# Patient Record
Sex: MALE | Race: WHITE | Employment: UNEMPLOYED | URBAN - NONMETROPOLITAN AREA
[De-identification: names, ages, dates, MRNs, and addresses within clinical notes are randomized per-mention and may not be internally consistent; named-entity substitution may affect disease eponyms.]

---

## 2021-07-19 ENCOUNTER — HOSPITAL ENCOUNTER (EMERGENCY)
Age: 61
Discharge: HOME | End: 2021-07-19
Payer: COMMERCIAL

## 2021-07-19 ENCOUNTER — TELEPHONE (OUTPATIENT)
Dept: UROLOGY | Age: 61
End: 2021-07-19

## 2021-07-19 ENCOUNTER — APPOINTMENT (OUTPATIENT)
Dept: CT IMAGING | Age: 61
End: 2021-07-19
Payer: COMMERCIAL

## 2021-07-19 ENCOUNTER — OFFICE VISIT (OUTPATIENT)
Dept: UROLOGY | Age: 61
End: 2021-07-19
Payer: COMMERCIAL

## 2021-07-19 VITALS
BODY MASS INDEX: 44.95 KG/M2 | DIASTOLIC BLOOD PRESSURE: 88 MMHG | HEIGHT: 70 IN | SYSTOLIC BLOOD PRESSURE: 136 MMHG | WEIGHT: 314 LBS | TEMPERATURE: 97.8 F | HEART RATE: 97 BPM

## 2021-07-19 VITALS
WEIGHT: 310 LBS | BODY MASS INDEX: 44.48 KG/M2 | RESPIRATION RATE: 18 BRPM | DIASTOLIC BLOOD PRESSURE: 75 MMHG | OXYGEN SATURATION: 93 % | TEMPERATURE: 99.2 F | HEART RATE: 75 BPM | SYSTOLIC BLOOD PRESSURE: 145 MMHG

## 2021-07-19 DIAGNOSIS — N20.0 KIDNEY STONE: Primary | ICD-10-CM

## 2021-07-19 DIAGNOSIS — N20.1 URETERAL STONE: Primary | ICD-10-CM

## 2021-07-19 LAB
-: ABNORMAL
ABSOLUTE EOS #: 0.11 K/UL (ref 0–0.44)
ABSOLUTE IMMATURE GRANULOCYTE: 0.03 K/UL (ref 0–0.3)
ABSOLUTE LYMPH #: 0.95 K/UL (ref 1.1–3.7)
ABSOLUTE MONO #: 0.77 K/UL (ref 0.1–1.2)
ALBUMIN SERPL-MCNC: 4.1 G/DL (ref 3.5–5.2)
ALBUMIN/GLOBULIN RATIO: 1.2 (ref 1–2.5)
ALP BLD-CCNC: 59 U/L (ref 40–129)
ALT SERPL-CCNC: 17 U/L (ref 5–41)
AMORPHOUS: ABNORMAL
ANION GAP SERPL CALCULATED.3IONS-SCNC: 15 MMOL/L (ref 9–17)
AST SERPL-CCNC: 21 U/L
BACTERIA: ABNORMAL
BASOPHILS # BLD: 1 % (ref 0–2)
BASOPHILS ABSOLUTE: 0.05 K/UL (ref 0–0.2)
BILIRUB SERPL-MCNC: 0.56 MG/DL (ref 0.3–1.2)
BILIRUBIN URINE: ABNORMAL
BUN BLDV-MCNC: 19 MG/DL (ref 6–20)
BUN/CREAT BLD: 16 (ref 9–20)
CALCIUM SERPL-MCNC: 8.8 MG/DL (ref 8.6–10.4)
CASTS UA: ABNORMAL /LPF
CHLORIDE BLD-SCNC: 104 MMOL/L (ref 98–107)
CO2: 23 MMOL/L (ref 20–31)
COLOR: ABNORMAL
COMMENT UA: ABNORMAL
CREAT SERPL-MCNC: 1.17 MG/DL (ref 0.7–1.2)
CRYSTALS, UA: ABNORMAL /HPF
DIFFERENTIAL TYPE: ABNORMAL
EOSINOPHILS RELATIVE PERCENT: 1 % (ref 1–4)
EPITHELIAL CELLS UA: ABNORMAL /HPF (ref 0–5)
GFR AFRICAN AMERICAN: >60 ML/MIN
GFR NON-AFRICAN AMERICAN: >60 ML/MIN
GFR SERPL CREATININE-BSD FRML MDRD: ABNORMAL ML/MIN/{1.73_M2}
GFR SERPL CREATININE-BSD FRML MDRD: ABNORMAL ML/MIN/{1.73_M2}
GLUCOSE BLD-MCNC: 146 MG/DL (ref 70–99)
GLUCOSE URINE: ABNORMAL
HCT VFR BLD CALC: 46.6 % (ref 40.7–50.3)
HEMOGLOBIN: 15.4 G/DL (ref 13–17)
IMMATURE GRANULOCYTES: 0 %
KETONES, URINE: ABNORMAL
LEUKOCYTE ESTERASE, URINE: ABNORMAL
LIPASE: 31 U/L (ref 13–60)
LYMPHOCYTES # BLD: 9 % (ref 24–43)
MCH RBC QN AUTO: 30.3 PG (ref 25.2–33.5)
MCHC RBC AUTO-ENTMCNC: 33 G/DL (ref 28.4–34.8)
MCV RBC AUTO: 91.7 FL (ref 82.6–102.9)
MONOCYTES # BLD: 7 % (ref 3–12)
MUCUS: ABNORMAL
NITRITE, URINE: ABNORMAL
NRBC AUTOMATED: 0 PER 100 WBC
OTHER OBSERVATIONS UA: ABNORMAL
PDW BLD-RTO: 12.9 % (ref 11.8–14.4)
PH UA: ABNORMAL (ref 5–9)
PLATELET # BLD: 231 K/UL (ref 138–453)
PLATELET ESTIMATE: ABNORMAL
PMV BLD AUTO: 9.6 FL (ref 8.1–13.5)
POTASSIUM SERPL-SCNC: 3.8 MMOL/L (ref 3.7–5.3)
PROTEIN UA: ABNORMAL
RBC # BLD: 5.08 M/UL (ref 4.21–5.77)
RBC # BLD: ABNORMAL 10*6/UL
RBC UA: ABNORMAL /HPF (ref 0–2)
RENAL EPITHELIAL, UA: ABNORMAL /HPF
SEG NEUTROPHILS: 82 % (ref 36–65)
SEGMENTED NEUTROPHILS ABSOLUTE COUNT: 8.79 K/UL (ref 1.5–8.1)
SODIUM BLD-SCNC: 142 MMOL/L (ref 135–144)
SPECIFIC GRAVITY UA: 1.02 (ref 1.01–1.02)
TOTAL PROTEIN: 7.4 G/DL (ref 6.4–8.3)
TRICHOMONAS: ABNORMAL
TURBIDITY: ABNORMAL
URINE HGB: ABNORMAL
UROBILINOGEN, URINE: ABNORMAL
WBC # BLD: 10.7 K/UL (ref 3.5–11.3)
WBC # BLD: ABNORMAL 10*3/UL
WBC UA: ABNORMAL /HPF (ref 0–5)
YEAST: ABNORMAL

## 2021-07-19 PROCEDURE — 83690 ASSAY OF LIPASE: CPT

## 2021-07-19 PROCEDURE — 80053 COMPREHEN METABOLIC PANEL: CPT

## 2021-07-19 PROCEDURE — 96375 TX/PRO/DX INJ NEW DRUG ADDON: CPT

## 2021-07-19 PROCEDURE — 36415 COLL VENOUS BLD VENIPUNCTURE: CPT

## 2021-07-19 PROCEDURE — 99213 OFFICE O/P EST LOW 20 MIN: CPT | Performed by: NURSE PRACTITIONER

## 2021-07-19 PROCEDURE — 81001 URINALYSIS AUTO W/SCOPE: CPT

## 2021-07-19 PROCEDURE — 96374 THER/PROPH/DIAG INJ IV PUSH: CPT

## 2021-07-19 PROCEDURE — 85025 COMPLETE CBC W/AUTO DIFF WBC: CPT

## 2021-07-19 PROCEDURE — 6360000002 HC RX W HCPCS: Performed by: PHYSICIAN ASSISTANT

## 2021-07-19 PROCEDURE — 2580000003 HC RX 258: Performed by: PHYSICIAN ASSISTANT

## 2021-07-19 PROCEDURE — 74176 CT ABD & PELVIS W/O CONTRAST: CPT

## 2021-07-19 PROCEDURE — 99284 EMERGENCY DEPT VISIT MOD MDM: CPT

## 2021-07-19 RX ORDER — CEPHALEXIN 500 MG/1
CAPSULE ORAL
COMMUNITY
Start: 2021-07-16 | End: 2021-07-21 | Stop reason: ALTCHOICE

## 2021-07-19 RX ORDER — KETOROLAC TROMETHAMINE 15 MG/ML
30 INJECTION, SOLUTION INTRAMUSCULAR; INTRAVENOUS ONCE
Status: COMPLETED | OUTPATIENT
Start: 2021-07-19 | End: 2021-07-19

## 2021-07-19 RX ORDER — ONDANSETRON 2 MG/ML
4 INJECTION INTRAMUSCULAR; INTRAVENOUS ONCE
Status: COMPLETED | OUTPATIENT
Start: 2021-07-19 | End: 2021-07-19

## 2021-07-19 RX ORDER — FENTANYL CITRATE 50 UG/ML
25 INJECTION, SOLUTION INTRAMUSCULAR; INTRAVENOUS ONCE
Status: COMPLETED | OUTPATIENT
Start: 2021-07-19 | End: 2021-07-19

## 2021-07-19 RX ORDER — ACETAMINOPHEN 500 MG
500 TABLET ORAL EVERY 6 HOURS PRN
COMMUNITY
End: 2021-09-22

## 2021-07-19 RX ORDER — TAMSULOSIN HYDROCHLORIDE 0.4 MG/1
CAPSULE ORAL
COMMUNITY
Start: 2021-07-16 | End: 2021-09-22

## 2021-07-19 RX ORDER — APIXABAN 5 MG/1
5 TABLET, FILM COATED ORAL 2 TIMES DAILY
COMMUNITY
Start: 2021-07-14 | End: 2021-09-22

## 2021-07-19 RX ORDER — 0.9 % SODIUM CHLORIDE 0.9 %
500 INTRAVENOUS SOLUTION INTRAVENOUS ONCE
Status: COMPLETED | OUTPATIENT
Start: 2021-07-19 | End: 2021-07-19

## 2021-07-19 RX ORDER — HYDROCODONE BITARTRATE AND ACETAMINOPHEN 5; 325 MG/1; MG/1
TABLET ORAL
COMMUNITY
Start: 2021-07-16 | End: 2021-09-22

## 2021-07-19 RX ORDER — PHENAZOPYRIDINE HYDROCHLORIDE 200 MG/1
TABLET, FILM COATED ORAL
COMMUNITY
Start: 2021-07-16 | End: 2021-09-22

## 2021-07-19 RX ADMIN — KETOROLAC TROMETHAMINE 30 MG: 15 INJECTION, SOLUTION INTRAMUSCULAR; INTRAVENOUS at 15:27

## 2021-07-19 RX ADMIN — ONDANSETRON 4 MG: 2 INJECTION INTRAMUSCULAR; INTRAVENOUS at 15:25

## 2021-07-19 RX ADMIN — FENTANYL CITRATE 25 MCG: 50 INJECTION INTRAMUSCULAR; INTRAVENOUS at 15:30

## 2021-07-19 RX ADMIN — SODIUM CHLORIDE 500 ML: 9 INJECTION, SOLUTION INTRAVENOUS at 15:24

## 2021-07-19 ASSESSMENT — PAIN SCALES - GENERAL
PAINLEVEL_OUTOF10: 6
PAINLEVEL_OUTOF10: 6
PAINLEVEL_OUTOF10: 9

## 2021-07-19 ASSESSMENT — ENCOUNTER SYMPTOMS
EYE REDNESS: 0
VOMITING: 0
BACK PAIN: 1
CONSTIPATION: 0
BACK PAIN: 0
NAUSEA: 0
BOWEL INCONTINENCE: 0
COLOR CHANGE: 0
ABDOMINAL PAIN: 0
SHORTNESS OF BREATH: 0
COUGH: 0
WHEEZING: 0

## 2021-07-19 ASSESSMENT — PAIN DESCRIPTION - PAIN TYPE
TYPE: ACUTE PAIN
TYPE: ACUTE PAIN

## 2021-07-19 ASSESSMENT — PAIN DESCRIPTION - FREQUENCY: FREQUENCY: INTERMITTENT

## 2021-07-19 ASSESSMENT — PAIN DESCRIPTION - LOCATION
LOCATION: FLANK
LOCATION: FLANK

## 2021-07-19 ASSESSMENT — PAIN DESCRIPTION - DESCRIPTORS: DESCRIPTORS: SHARP

## 2021-07-19 ASSESSMENT — PAIN DESCRIPTION - ORIENTATION: ORIENTATION: RIGHT

## 2021-07-19 NOTE — TELEPHONE ENCOUNTER
Wife called stated patient is c/o severe pain since his stent pull this AM and nausea. He is not able to sit, stand or lie down d/t the pain. He did take a norco, no relief.         Writer spoke to [facility] and VO given to patient to go to ER

## 2021-07-19 NOTE — ED PROVIDER NOTES
[facility] ED  eMERGENCY dEPARTMENT eNCOUnter      Pt Name: Marlen Pathak  MRN: [id number]  [place] 1960  Date of evaluation: 7/19/21      CHIEF COMPLAINT       Chief Complaint   Patient presents with    Flank Pain     right side-had uretal stent removed today around noon. Had stones blasted stones Sat morning         HISTORY OF PRESENT ILLNESS    Marlen Pathak is a 62 y.o. male who presents complaining of right side flank pain  The history is provided by the patient. Back Pain  Pain location: Right flank pain states that he had a lithotripsy done on Saturday and had a stent removed today by the urologist he is now having right flank pain with some nausea. He does have some blood in the urine. Quality:  Aching  Pain severity:  Moderate  Timing:  Constant  Progression:  Unchanged  Chronicity:  New  Relieved by:  Nothing  Worsened by:  Nothing  Ineffective treatments:  None tried  Associated symptoms: no bladder incontinence, no bowel incontinence, no dysuria, no leg pain, no paresthesias, no pelvic pain, no perianal numbness and no tingling        REVIEW OF SYSTEMS       Review of Systems   Gastrointestinal: Negative for bowel incontinence. Genitourinary: Positive for hematuria. Negative for bladder incontinence, dysuria and pelvic pain. Musculoskeletal: Positive for back pain. Neurological: Negative for tingling and paresthesias. All other systems reviewed and are negative.       PAST MEDICAL HISTORY     Past Medical History:   Diagnosis Date    Hyperglycemia     Kidney stone        SURGICAL HISTORY       Past Surgical History:   Procedure Laterality Date    CYSTOSCOPY Right 07/17/2021    Right HLL done at [address] [place] [place] [address]       Discharge Medication List as of 7/19/2021  6:13 PM      CONTINUE these medications which have NOT CHANGED    Details   ELIQUIS 5 MG TABS tablet Take 5 mg by mouth 2 times daily , DAWHistorical Med      cephALEXin (KEFLEX) 500 MG capsule take 1 capsule by mouth every 12 hours for 3 daysHistorical Med      HYDROcodone-acetaminophen (NORCO) 5-325 MG per tablet take 1 tablet by mouth every 6 hours if needed for pain for 3 daysHistorical Med      phenazopyridine (PYRIDIUM) 200 MG tablet take 1 tablet by mouth every 8 hours if needed BURNING TO VOIDHistorical Med      tamsulosin (FLOMAX) 0.4 MG capsule take 1 capsule by mouth at bedtimeHistorical Med      acetaminophen (TYLENOL) 500 MG tablet Take 500 mg by mouth every 6 hours as needed for PainHistorical Med             ALLERGIES     has No Known Allergies. FAMILY HISTORY     He indicated that his mother is alive. He indicated that his father is . SOCIAL HISTORY      reports that he quit smoking about 26 years ago. His smoking use included cigarettes. He started smoking about 41 years ago. He has a 22.50 pack-year smoking history. He quit smokeless tobacco use about 8 years ago. His smokeless tobacco use included snuff. He reports current alcohol use. He reports that he does not use drugs. PHYSICAL EXAM     INITIAL VITALS: BP (!) 145/75   Pulse 75   Temp 99.2 °F (37.3 °C)   Resp 18   Wt (!) 310 lb (140.6 kg)   SpO2 93%   BMI 44.48 kg/m²      Physical Exam  Vitals and nursing note reviewed. Constitutional:       Appearance: He is well-developed. HENT:      Head: Normocephalic and atraumatic. Eyes:      Pupils: Pupils are equal, round, and reactive to light. Cardiovascular:      Rate and Rhythm: Normal rate and regular rhythm. Heart sounds: Normal heart sounds. No murmur heard. Pulmonary:      Effort: Pulmonary effort is normal.      Breath sounds: Normal breath sounds. Abdominal:      General: Bowel sounds are normal.      Palpations: Abdomen is soft. Tenderness: There is no abdominal tenderness. Musculoskeletal:         General: Normal range of motion. Cervical back: Normal range of motion. Back:    Skin:     General: Skin is warm and dry. Capillary Refill: Capillary refill takes less than 2 seconds. Neurological:      Mental Status: He is alert and oriented to person, place, and time. MEDICAL DECISION MAKING:   He is pain-free upon rexam, He has ATB, pain med and flomax at home. He is anxious to go home, discussed test results with Patient. Continue current medications follow-up with your urologist as scheduled if pain returns please return to the emergency department or if you have any worsening of symptoms. DIAGNOSTIC RESULTS     EKG: All EKG's are interpreted by the Emergency Department Physician who either signs or Co-signs this chart in the absence of acardiologist.        RADIOLOGY:Allplain film, CT, MRI, and formal ultrasound images (except ED bedside ultrasound) are read by the radiologist and the images and interpretations are directly viewed by the emergency physician. LABS:All lab results were reviewed by myself, and all abnormals are listed below. Labs Reviewed   CBC WITH AUTO DIFFERENTIAL - Abnormal; Notable for the following components:       Result Value    Seg Neutrophils 82 (*)     Lymphocytes 9 (*)     Segs Absolute 8.79 (*)     Absolute Lymph # 0.95 (*)     All other components within normal limits   COMPREHENSIVE METABOLIC PANEL W/ REFLEX TO MG FOR LOW K - Abnormal; Notable for the following components:    Glucose 146 (*)     All other components within normal limits   URINE RT REFLEX TO CULTURE - Abnormal; Notable for the following components:    Color, UA RED (*)     Turbidity UA CLOUDY (*)     Glucose, Ur   (*)     Value: INTERPRET WITH CAUTION DUE TO INTENSE COLOR OF URINE. Bilirubin Urine   (*)     Value: INTERPRET WITH CAUTION DUE TO INTENSE COLOR OF URINE. Ketones, Urine   (*)     Value: INTERPRET WITH CAUTION DUE TO INTENSE COLOR OF URINE. Urine Hgb   (*)     Value: INTERPRET WITH CAUTION DUE TO INTENSE COLOR OF URINE.     Protein, UA   (*)     Value: INTERPRET WITH CAUTION DUE TO INTENSE COLOR OF URINE. Urobilinogen, Urine   (*)     Value: INTERPRET WITH CAUTION DUE TO INTENSE COLOR OF URINE. Nitrite, Urine   (*)     Value: INTERPRET WITH CAUTION DUE TO INTENSE COLOR OF URINE. Leukocyte Esterase, Urine   (*)     Value: INTERPRET WITH CAUTION DUE TO INTENSE COLOR OF URINE.     All other components within normal limits   MICROSCOPIC URINALYSIS - Abnormal; Notable for the following components:    Bacteria, UA TRACE (*)     All other components within normal limits   LIPASE         EMERGENCY DEPARTMENT COURSE:   Vitals:    Vitals:    07/19/21 1540 07/19/21 1632 07/19/21 1700 07/19/21 1800   BP: (!) 149/75 (!) 146/37 131/62 (!) 145/75   Pulse:  75     Resp:  18     Temp:       SpO2: 93% 94% 94% 93%   Weight:           The patient was given the following medications while in the emergency department:  Orders Placed This Encounter   Medications    ondansetron (ZOFRAN) injection 4 mg    ketorolac (TORADOL) injection 30 mg    fentaNYL (SUBLIMAZE) injection 25 mcg    0.9 % sodium chloride bolus       -------------------------      CRITICAL CARE:     CONSULTS:  None    PROCEDURES:  Procedures    FINAL IMPRESSION      1. Kidney stone Stable         DISPOSITION/PLAN   DISPOSITION        PATIENT REFERREDTO:  Khadar Pickering MD  [address] [place] [address]  [phone]    Schedule an appointment as soon as possible for a visit in 2 days      [facility] ED  [address] [place] [address]  [phone]    If symptoms worsen      DISCHARGEMEDICATIONS:  Discharge Medication List as of 7/19/2021  6:13 PM          (Please note that portions of this note were completed with a voice recognition program.  Efforts were made to edit thedictations but occasionally words are mis-transcribed.)    CADENCE Bergman PA-C  07/19/21 5113

## 2021-07-19 NOTE — PROGRESS NOTES
allergies. History reviewed. No pertinent family history. Social History     Tobacco Use   Smoking Status Former Smoker    Packs/day: 1.50    Years: 15.00    Pack years: 22.50    Types: Cigarettes    Start date: 37    Quit date:     Years since quittin.5   Smokeless Tobacco Former User    Types: Snuff    Quit date:        Social History     Substance and Sexual Activity   Alcohol Use Yes       Review of Systems   Constitutional: Negative for appetite change, chills and fever. Eyes: Negative for redness and visual disturbance. Respiratory: Negative for cough, shortness of breath and wheezing. Cardiovascular: Negative for chest pain and leg swelling. Gastrointestinal: Negative for abdominal pain, constipation, nausea and vomiting. Genitourinary: Negative for decreased urine volume, difficulty urinating, discharge, dysuria, enuresis, flank pain, frequency, hematuria, penile pain, scrotal swelling, testicular pain and urgency. Musculoskeletal: Negative for back pain, joint swelling and myalgias. Skin: Negative for color change, rash and wound. Neurological: Negative for dizziness, tremors and numbness. Hematological: Negative for adenopathy. Does not bruise/bleed easily. /88 (Site: Right Upper Arm, Position: Sitting, Cuff Size: Large Adult)   Pulse 97   Temp 97.8 °F (36.6 °C) (Temporal)   Ht 5' 10\" (1.778 m)   Wt (!) 314 lb (142.4 kg)   BMI 45.05 kg/m²       PHYSICAL EXAM:  Constitutional: Patient in no acute distress; Neuro: alert and oriented to person place and time. Psych: Mood and affect normal.  Skin: Normal  Lungs: Respiratory effort normal  Cardiovascular:  Normal peripheral pulses  Abdomen: Soft, non-tender, non-distended with no CVA, flank pain  Bladder non-tender and not distended. No results found for: BUN  No results found for: CREATININE  No results found for: PSA    ASSESSMENT:   Diagnosis Orders   1.  Ureteral stone  XR ABDOMEN (KUB) (SINGLE AP VIEW)         PLAN:  You may experience waves of pain and/or nausea for the next 24-72 hrs. You may also experience burning with urination, frequency, urgency, bladder spasms, and blood in the urine. All of this should continue to improve over the next several days. The blood in the urine can last up to two weeks. 1) take over the counter tylenol as directed every 4-6 hours for the next 48-72 hours  2) take Flomax for the next 72 hours. 3) drink at least 80 oz fluid (water, juice, Gatorade - NOT tea, coffee, soda pop) daily    Take norco as directed if needed     Finish antibiotic as prescribed     Call our office [phone] or go to ER (if after normal office hours) if you develop fever, intractable vomiting, severe/intolerable pain. Follow-up in 6 weeks with a KUB prior or sooner if needed.

## 2021-07-19 NOTE — PATIENT INSTRUCTIONS
You may experience waves of pain and/or nausea for the next 24-72 hrs. You may also experience burning with urination, frequency, urgency, bladder spasms, and blood in the urine. All of this should continue to improve over the next several days. The blood in the urine can last up to two weeks. 1) take over the counter tylenol as directed every 4-6 hours for the next 48-72 hours  2) take Flomax for the next 72 hours. 3) drink at least 80 oz fluid (water, juice, Gatorade - NOT tea, coffee, soda pop) daily    Take norco as directed if needed     Finish antibiotic as prescribed     Call our office [phone] or go to ER (if after normal office hours) if you develop fever, intractable vomiting, severe/intolerable pain.

## 2021-07-20 ENCOUNTER — TELEPHONE (OUTPATIENT)
Dept: UROLOGY | Age: 61
End: 2021-07-20

## 2021-07-20 NOTE — TELEPHONE ENCOUNTER
Patient went to ER yesterday and was supposed to follow up today. He had pain this morning but is doing pretty good now. Do you want to see him today?

## 2021-07-21 ENCOUNTER — OFFICE VISIT (OUTPATIENT)
Dept: UROLOGY | Age: 61
End: 2021-07-21
Payer: COMMERCIAL

## 2021-07-21 VITALS
DIASTOLIC BLOOD PRESSURE: 83 MMHG | SYSTOLIC BLOOD PRESSURE: 134 MMHG | HEART RATE: 66 BPM | WEIGHT: 315 LBS | BODY MASS INDEX: 45.2 KG/M2

## 2021-07-21 DIAGNOSIS — N20.1 URETERAL STONE: Primary | ICD-10-CM

## 2021-07-21 PROCEDURE — 99213 OFFICE O/P EST LOW 20 MIN: CPT | Performed by: PHYSICIAN ASSISTANT

## 2021-07-21 ASSESSMENT — ENCOUNTER SYMPTOMS
BACK PAIN: 0
COUGH: 0
WHEEZING: 0
EYE REDNESS: 0
CONSTIPATION: 1
COLOR CHANGE: 0
VOMITING: 0
SHORTNESS OF BREATH: 0
NAUSEA: 0
ABDOMINAL PAIN: 0

## 2021-07-21 NOTE — PROGRESS NOTES
HPI:      Patient is a 62 y.o. male in no acute distress. He is alert and oriented to person, place, and time. History  7/2021 Right HLL    Today  Patient did have his stent removed earlier in the week. Patient developed significant pain, hematuria and went to the emergency room. Patient did have a urinalysis that did show significant blood and some white blood cells. Urine nitrate and leukocyte esterase were both negative. Patient's kidney function was normal in the emergency room. His white blood cell count was 10.7. Patient states that he did pass some stones this morning. He states that his pain waxes and wanes. He states that currently he has discomfort 1 out of 10. He states that he has been drinking plenty of water and continues to take Flomax. He is not having a daily bowel movement. He did take a Dulcolax yesterday and MiraLAX this morning. Patient did have a CT scan in the emergency room on the 19th which was independently reviewed showing multiple small right renal calculi as well as a punctate right distal ureteral stone.       Past Medical History:   Diagnosis Date    Hyperglycemia     Kidney stone      Past Surgical History:   Procedure Laterality Date    CYSTOSCOPY Right 07/17/2021    Right HLL done at [facility]     Outpatient Encounter Medications as of 7/21/2021   Medication Sig Dispense Refill    ELIQUIS 5 MG TABS tablet Take 5 mg by mouth 2 times daily       HYDROcodone-acetaminophen (NORCO) 5-325 MG per tablet take 1 tablet by mouth every 6 hours if needed for pain for 3 days      tamsulosin (FLOMAX) 0.4 MG capsule take 1 capsule by mouth at bedtime      acetaminophen (TYLENOL) 500 MG tablet Take 500 mg by mouth every 6 hours as needed for Pain      phenazopyridine (PYRIDIUM) 200 MG tablet take 1 tablet by mouth every 8 hours if needed BURNING TO VOID (Patient not taking: Reported on 7/21/2021)      [DISCONTINUED] cephALEXin (KEFLEX) 500 MG capsule take 1 capsule by mouth every 12 hours for 3 days (Patient not taking: Reported on 2021)       No facility-administered encounter medications on file as of 2021. Current Outpatient Medications on File Prior to Visit   Medication Sig Dispense Refill    ELIQUIS 5 MG TABS tablet Take 5 mg by mouth 2 times daily       HYDROcodone-acetaminophen (NORCO) 5-325 MG per tablet take 1 tablet by mouth every 6 hours if needed for pain for 3 days      tamsulosin (FLOMAX) 0.4 MG capsule take 1 capsule by mouth at bedtime      acetaminophen (TYLENOL) 500 MG tablet Take 500 mg by mouth every 6 hours as needed for Pain      phenazopyridine (PYRIDIUM) 200 MG tablet take 1 tablet by mouth every 8 hours if needed BURNING TO VOID (Patient not taking: Reported on 2021)       No current facility-administered medications on file prior to visit. Patient has no known allergies. History reviewed. No pertinent family history. Social History     Tobacco Use   Smoking Status Former Smoker    Packs/day: 1.50    Years: 15.00    Pack years: 22.50    Types: Cigarettes    Start date:     Quit date:     Years since quittin.5   Smokeless Tobacco Former User    Types: Snuff    Quit date:        Social History     Substance and Sexual Activity   Alcohol Use Yes    Comment: social       Review of Systems   Constitutional: Negative for appetite change, chills and fever. Eyes: Negative for redness and visual disturbance. Respiratory: Negative for cough, shortness of breath and wheezing. Cardiovascular: Negative for chest pain and leg swelling. Gastrointestinal: Positive for constipation. Negative for abdominal pain, nausea and vomiting. Genitourinary: Positive for hematuria. Negative for decreased urine volume, difficulty urinating, discharge, dysuria, enuresis, flank pain, frequency, penile pain, scrotal swelling, testicular pain and urgency. Musculoskeletal: Negative for back pain, joint swelling and myalgias. Skin: Negative for color change, rash and wound. Neurological: Negative for dizziness, tremors and numbness. Hematological: Negative for adenopathy. Does not bruise/bleed easily. /83 (Site: Left Upper Arm, Position: Sitting, Cuff Size: Large Adult)   Pulse 66   Wt (!) 315 lb (142.9 kg)   BMI 45.20 kg/m²       PHYSICAL EXAM:  Constitutional: Patient in no acute distress; Neuro: alert and oriented to person place and time. Psych: Mood and affect normal.  Lungs: Respiratory effort normal  Cardiovascular:  Normal peripheral pulses  Abdomen: Soft, non-tender, non-distended, NO CVA tenderness   Rectal: Deferred      Lab Results   Component Value Date    BUN 19 07/19/2021     Lab Results   Component Value Date    CREATININE 1.17 07/19/2021     No results found for: PSA    ASSESSMENT:   Diagnosis Orders   1. Ureteral stone         PLAN:  Reassured him that his discomfort he had and hematuria was due to stent removal and passing small stone fragments. Continue Flomax for at least 2 more weeks    Cannot take NSAIDs due to eliquis     Pain medicine from the emergency room as needed    Encourage water intake    Follow-up as scheduled    If patient develops any new or worsening pain, intractable nausea vomiting or fever chills he should contact our office.

## 2021-08-30 ENCOUNTER — HOSPITAL ENCOUNTER (OUTPATIENT)
Age: 61
Discharge: HOME | End: 2021-09-01
Payer: COMMERCIAL

## 2021-08-30 ENCOUNTER — HOSPITAL ENCOUNTER (OUTPATIENT)
Dept: GENERAL RADIOLOGY | Age: 61
Discharge: HOME | End: 2021-09-01
Payer: COMMERCIAL

## 2021-08-30 ENCOUNTER — OFFICE VISIT (OUTPATIENT)
Dept: UROLOGY | Age: 61
End: 2021-08-30
Payer: COMMERCIAL

## 2021-08-30 VITALS
WEIGHT: 311 LBS | TEMPERATURE: 97.7 F | HEIGHT: 70 IN | BODY MASS INDEX: 44.52 KG/M2 | SYSTOLIC BLOOD PRESSURE: 138 MMHG | DIASTOLIC BLOOD PRESSURE: 91 MMHG | HEART RATE: 84 BPM

## 2021-08-30 DIAGNOSIS — N20.0 KIDNEY STONE: ICD-10-CM

## 2021-08-30 DIAGNOSIS — N28.89 RENAL MASS: Primary | ICD-10-CM

## 2021-08-30 DIAGNOSIS — N20.1 URETERAL STONE: ICD-10-CM

## 2021-08-30 PROCEDURE — 74018 RADEX ABDOMEN 1 VIEW: CPT

## 2021-08-30 PROCEDURE — 99213 OFFICE O/P EST LOW 20 MIN: CPT | Performed by: UROLOGY

## 2021-08-30 NOTE — PATIENT INSTRUCTIONS
SURVEY:    You may be receiving a survey from [employer] regarding your visit today. Please complete the survey to enable us to provide the highest quality of care to you and your family. If you cannot score us a very good on any question, please call the office to discuss how we could of made your experience a very good one. Thank you.

## 2021-08-30 NOTE — PROGRESS NOTES
HPI:          Patient is a 62 y.o. male in no acute distress. He is alert and oriented to person, place, and time. History  7/2021 Right HLL      Currently  Patient is here today for 6-week follow-up. Patient is status post right-sided holmium laser lithotripsy. Patient did get a repeat KUB. This film was independently reviewed today. Past Medical History:   Diagnosis Date    Hyperglycemia     Kidney stone      Past Surgical History:   Procedure Laterality Date    CYSTOSCOPY Right 07/17/2021    Right HLL done at [facility]     Outpatient Encounter Medications as of 8/30/2021   Medication Sig Dispense Refill    ELIQUIS 5 MG TABS tablet Take 5 mg by mouth 2 times daily  (Patient not taking: Reported on 8/30/2021)      HYDROcodone-acetaminophen (NORCO) 5-325 MG per tablet take 1 tablet by mouth every 6 hours if needed for pain for 3 days (Patient not taking: Reported on 8/30/2021)      phenazopyridine (PYRIDIUM) 200 MG tablet take 1 tablet by mouth every 8 hours if needed BURNING TO VOID (Patient not taking: Reported on 7/21/2021)      tamsulosin (FLOMAX) 0.4 MG capsule take 1 capsule by mouth at bedtime (Patient not taking: Reported on 8/30/2021)      acetaminophen (TYLENOL) 500 MG tablet Take 500 mg by mouth every 6 hours as needed for Pain (Patient not taking: Reported on 8/30/2021)       No facility-administered encounter medications on file as of 8/30/2021.       Current Outpatient Medications on File Prior to Visit   Medication Sig Dispense Refill    ELIQUIS 5 MG TABS tablet Take 5 mg by mouth 2 times daily  (Patient not taking: Reported on 8/30/2021)      HYDROcodone-acetaminophen (NORCO) 5-325 MG per tablet take 1 tablet by mouth every 6 hours if needed for pain for 3 days (Patient not taking: Reported on 8/30/2021)      phenazopyridine (PYRIDIUM) 200 MG tablet take 1 tablet by mouth every 8 hours if needed BURNING TO VOID (Patient not taking: Reported on 7/21/2021)      tamsulosin (FLOMAX) 0.4 MG capsule take 1 capsule by mouth at bedtime (Patient not taking: Reported on 2021)      acetaminophen (TYLENOL) 500 MG tablet Take 500 mg by mouth every 6 hours as needed for Pain (Patient not taking: Reported on 2021)       No current facility-administered medications on file prior to visit. Patient has no known allergies. No family history on file. Social History     Tobacco Use   Smoking Status Former Smoker    Packs/day: 1.50    Years: 15.00    Pack years: 22.50    Types: Cigarettes    Start date:     Quit date:     Years since quittin.6   Smokeless Tobacco Former User    Types: Snuff    Quit date:        Social History     Substance and Sexual Activity   Alcohol Use Yes    Comment: social       Review of Systems    Temp 97.7 °F (36.5 °C) (Temporal)   Ht 5' 10\" (1.778 m)   Wt (!) 311 lb (141.1 kg)   BMI 44.62 kg/m²       PHYSICAL EXAM:  Constitutional: Patient in no acute distress; Neuro: alert and oriented to person place and time. Psych: Mood and affect normal.  Skin: Normal  Lungs: Respiratory effort normal  Cardiovascular:  Normal peripheral pulses  Abdomen: Soft, non-tender, non-distended with no CVA, flank pain  Bladder non-tender and not distended. Lymphatics: no palpable lymphadenopathy  Penis normal  Urethral meatus normal  Scrotal exam normal  Testicles normal bilaterally  Epididymis normal bilaterally  No evidence of inguinal hernia        Lab Results   Component Value Date    BUN 19 2021     Lab Results   Component Value Date    CREATININE 1.17 2021     No results found for: PSA    ASSESSMENT:  This is a 62 y.o. male with the following diagnoses:   Diagnosis Orders   1. Kidney stone  XR ABDOMEN (KUB) (SINGLE AP VIEW)       PLAN:  I did review patient's CT scans done today. I do think he has a suspicious lesion in his left kidney.   It is hard to tell if this is significant or not due to the lack of contrast.  I did call and discussed the case with radiology. At this point time they do feel that we should move forward with the study with contrast.  I did order CT scan with IV contrast.  From a stone follow-up standpoint we will repeat his KUB in 1 year.

## 2021-09-01 ENCOUNTER — TELEPHONE (OUTPATIENT)
Dept: UROLOGY | Age: 61
End: 2021-09-01

## 2021-09-01 DIAGNOSIS — N28.89 RENAL MASS: Primary | ICD-10-CM

## 2021-09-01 NOTE — TELEPHONE ENCOUNTER
Called patient and informed him that CT abdomen/pelvic with IV contrast recommended. Patient transferred to central scheduling to be scheduled. Informed patient that he will need to be seen in follow up after CT. Patient voiced understanding.

## 2021-09-01 NOTE — TELEPHONE ENCOUNTER
Order placed for patient to have CT abdomen/pelvis with IV contrast.  Need order for BUN/creatinine prior. Patient need appointment for follow up to discuss?

## 2021-09-16 ENCOUNTER — HOSPITAL ENCOUNTER (OUTPATIENT)
Dept: CT IMAGING | Age: 61
Discharge: HOME | End: 2021-09-18
Payer: COMMERCIAL

## 2021-09-16 ENCOUNTER — HOSPITAL ENCOUNTER (OUTPATIENT)
Age: 61
Discharge: HOME | End: 2021-09-16
Payer: COMMERCIAL

## 2021-09-16 DIAGNOSIS — N28.89 RENAL MASS: ICD-10-CM

## 2021-09-16 LAB
BUN BLDV-MCNC: 16 MG/DL (ref 8–23)
CHOLESTEROL/HDL RATIO: 5.7
CHOLESTEROL: 229 MG/DL
CREAT SERPL-MCNC: 0.86 MG/DL (ref 0.7–1.2)
GFR AFRICAN AMERICAN: >60 ML/MIN
GFR NON-AFRICAN AMERICAN: >60 ML/MIN
GFR SERPL CREATININE-BSD FRML MDRD: NORMAL ML/MIN/{1.73_M2}
GFR SERPL CREATININE-BSD FRML MDRD: NORMAL ML/MIN/{1.73_M2}
HDLC SERPL-MCNC: 40 MG/DL
LDL CHOLESTEROL: 144 MG/DL (ref 0–130)
TRIGL SERPL-MCNC: 225 MG/DL
VLDLC SERPL CALC-MCNC: ABNORMAL MG/DL (ref 1–30)

## 2021-09-16 PROCEDURE — 80061 LIPID PANEL: CPT

## 2021-09-16 PROCEDURE — 82565 ASSAY OF CREATININE: CPT

## 2021-09-16 PROCEDURE — 84520 ASSAY OF UREA NITROGEN: CPT

## 2021-09-16 PROCEDURE — 6360000004 HC RX CONTRAST MEDICATION: Performed by: UROLOGY

## 2021-09-16 PROCEDURE — 36415 COLL VENOUS BLD VENIPUNCTURE: CPT

## 2021-09-16 PROCEDURE — 74178 CT ABD&PLV WO CNTR FLWD CNTR: CPT

## 2021-09-16 RX ADMIN — IOPAMIDOL 100 ML: 755 INJECTION, SOLUTION INTRAVENOUS at 12:11

## 2021-09-22 ENCOUNTER — OFFICE VISIT (OUTPATIENT)
Dept: UROLOGY | Age: 61
End: 2021-09-22
Payer: COMMERCIAL

## 2021-09-22 VITALS
HEIGHT: 70 IN | SYSTOLIC BLOOD PRESSURE: 124 MMHG | BODY MASS INDEX: 44.52 KG/M2 | DIASTOLIC BLOOD PRESSURE: 90 MMHG | WEIGHT: 311 LBS | TEMPERATURE: 97.1 F

## 2021-09-22 DIAGNOSIS — N28.89 RENAL MASS: Primary | ICD-10-CM

## 2021-09-22 DIAGNOSIS — N20.0 KIDNEY STONE: ICD-10-CM

## 2021-09-22 PROCEDURE — 99213 OFFICE O/P EST LOW 20 MIN: CPT | Performed by: UROLOGY

## 2021-09-22 NOTE — PROGRESS NOTES
HPI:          Patient is a 62 y.o. male in no acute distress. He is alert and oriented to person, place, and time. History  7/2021 Right HLL     Currently  Patient is here today for review of CT scan. With the we did see a lesion in his left kidney on the last visit. His film did have a lack of contrast.  I did go ahead and have a contrast study performed. This film was independently reviewed today. This does not show any solid lesions in the left kidney. Patient does have a number of cysts but these are all simple appearing. Patient has no new or worsening lower urinary tract symptoms. No flank pain nausea vomiting. No unintentional weight loss or decreased appetite. Past Medical History:   Diagnosis Date    Hyperglycemia     Kidney stone      Past Surgical History:   Procedure Laterality Date    CYSTOSCOPY Right 07/17/2021    Right HLL done at [facility]     Outpatient Encounter Medications as of 9/22/2021   Medication Sig Dispense Refill    ELIQUIS 5 MG TABS tablet Take 5 mg by mouth 2 times daily  (Patient not taking: Reported on 8/30/2021)      HYDROcodone-acetaminophen (NORCO) 5-325 MG per tablet take 1 tablet by mouth every 6 hours if needed for pain for 3 days (Patient not taking: Reported on 8/30/2021)      phenazopyridine (PYRIDIUM) 200 MG tablet take 1 tablet by mouth every 8 hours if needed BURNING TO VOID (Patient not taking: Reported on 7/21/2021)      tamsulosin (FLOMAX) 0.4 MG capsule take 1 capsule by mouth at bedtime (Patient not taking: Reported on 8/30/2021)      acetaminophen (TYLENOL) 500 MG tablet Take 500 mg by mouth every 6 hours as needed for Pain (Patient not taking: Reported on 8/30/2021)       No facility-administered encounter medications on file as of 9/22/2021.       Current Outpatient Medications on File Prior to Visit   Medication Sig Dispense Refill    ELIQUIS 5 MG TABS tablet Take 5 mg by mouth 2 times daily  (Patient not taking: Reported on 8/30/2021)  HYDROcodone-acetaminophen (NORCO) 5-325 MG per tablet take 1 tablet by mouth every 6 hours if needed for pain for 3 days (Patient not taking: Reported on 2021)      phenazopyridine (PYRIDIUM) 200 MG tablet take 1 tablet by mouth every 8 hours if needed BURNING TO VOID (Patient not taking: Reported on 2021)      tamsulosin (FLOMAX) 0.4 MG capsule take 1 capsule by mouth at bedtime (Patient not taking: Reported on 2021)      acetaminophen (TYLENOL) 500 MG tablet Take 500 mg by mouth every 6 hours as needed for Pain (Patient not taking: Reported on 2021)       No current facility-administered medications on file prior to visit. Patient has no known allergies. No family history on file. Social History     Tobacco Use   Smoking Status Former Smoker    Packs/day: 1.50    Years: 15.00    Pack years: 22.50    Types: Cigarettes    Start date:     Quit date:     Years since quittin.7   Smokeless Tobacco Former User    Types: Snuff    Quit date:        Social History     Substance and Sexual Activity   Alcohol Use Yes    Comment: social       Review of Systems    BP (!) 124/90 (Site: Right Upper Arm, Position: Sitting, Cuff Size: Large Adult)   Temp 97.1 °F (36.2 °C)   Ht 5' 10\" (1.778 m)   Wt (!) 311 lb (141.1 kg)   BMI 44.62 kg/m²       PHYSICAL EXAM:  Constitutional: Patient in no acute distress; Neuro: alert and oriented to person place and time. Psych: Mood and affect normal.  Skin: Normal  Lungs: Respiratory effort normal  Cardiovascular:  Normal peripheral pulses  Abdomen: Soft, non-tender, non-distended with no CVA, flank pain  Bladder non-tender and not distended.   Lymphatics: no palpable lymphadenopathy  Penis normal  Urethral meatus normal  Scrotal exam normal  Testicles normal bilaterally  Epididymis normal bilaterally  No evidence of inguinal hernia        Lab Results   Component Value Date    BUN 2021     Lab Results   Component Value Date    CREATININE 0.86 09/16/2021     No results found for: PSA    ASSESSMENT:  This is a 62 y.o. male with the following diagnoses:   Diagnosis Orders   1. Renal mass     2. Kidney stone  XR ABDOMEN (KUB) (SINGLE AP VIEW)       PLAN:  Patient is clear in his left kidney. We will not follow this. Patient will follow up with us in 1 year with a repeat KUB.

## 2022-08-29 ENCOUNTER — HOSPITAL ENCOUNTER (OUTPATIENT)
Dept: GENERAL RADIOLOGY | Age: 62
Discharge: HOME | End: 2022-08-31
Payer: COMMERCIAL

## 2022-08-29 ENCOUNTER — OFFICE VISIT (OUTPATIENT)
Dept: UROLOGY | Age: 62
End: 2022-08-29
Payer: COMMERCIAL

## 2022-08-29 ENCOUNTER — HOSPITAL ENCOUNTER (OUTPATIENT)
Age: 62
Discharge: HOME | End: 2022-08-31
Payer: COMMERCIAL

## 2022-08-29 VITALS
BODY MASS INDEX: 41.9 KG/M2 | WEIGHT: 292 LBS | DIASTOLIC BLOOD PRESSURE: 72 MMHG | SYSTOLIC BLOOD PRESSURE: 110 MMHG | HEART RATE: 76 BPM

## 2022-08-29 DIAGNOSIS — N20.0 KIDNEY STONE: Primary | ICD-10-CM

## 2022-08-29 DIAGNOSIS — N20.0 KIDNEY STONE: ICD-10-CM

## 2022-08-29 PROCEDURE — 74018 RADEX ABDOMEN 1 VIEW: CPT

## 2022-08-29 PROCEDURE — 99213 OFFICE O/P EST LOW 20 MIN: CPT | Performed by: NURSE PRACTITIONER

## 2022-08-29 RX ORDER — LISINOPRIL 2.5 MG/1
TABLET ORAL
COMMUNITY
Start: 2022-05-23

## 2022-08-29 RX ORDER — ATORVASTATIN CALCIUM 20 MG/1
TABLET, FILM COATED ORAL
COMMUNITY
Start: 2022-05-23

## 2022-08-29 ASSESSMENT — ENCOUNTER SYMPTOMS
NAUSEA: 0
BACK PAIN: 0
CONSTIPATION: 0
VOMITING: 0
EYE REDNESS: 0
ABDOMINAL PAIN: 0
COUGH: 0
COLOR CHANGE: 0
SHORTNESS OF BREATH: 0
WHEEZING: 0

## 2022-08-29 NOTE — PROGRESS NOTES
HPI:          Patient is a 65 y.o. male in no acute distress. He is alert and oriented to person, place, and time. History  2021 Right HLL    Today  Here today to follow-up for stones. He denies any spontaneous stone passage. He denies any flank or abdominal pain. He denies a frequency, urgency, nocturia or incontinence. KUB was independently reviewed and shows no  calcifications. Known stones in right kidney are difficulty to visualized today. Past Medical History:   Diagnosis Date    Diabetes mellitus ([address]     Hyperglycemia     Kidney stone      Past Surgical History:   Procedure Laterality Date    CYSTOSCOPY Right 2021    Right HLL done at [facility]     Outpatient Encounter Medications as of 2022   Medication Sig Dispense Refill    atorvastatin (LIPITOR) 20 MG tablet       lisinopril (PRINIVIL;ZESTRIL) 2.5 MG tablet       metFORMIN (GLUCOPHAGE) 500 MG tablet        No facility-administered encounter medications on file as of 2022. Current Outpatient Medications on File Prior to Visit   Medication Sig Dispense Refill    atorvastatin (LIPITOR) 20 MG tablet       lisinopril (PRINIVIL;ZESTRIL) 2.5 MG tablet       metFORMIN (GLUCOPHAGE) 500 MG tablet        No current facility-administered medications on file prior to visit. Patient has no known allergies. Family History   Problem Relation Age of Onset    COPD Mother      Social History     Tobacco Use   Smoking Status Former    Packs/day: 1.50    Years: 15.00    Pack years: 22.50    Types: Cigarettes    Start date: [address]     Quit date:     Years since quittin.6   Smokeless Tobacco Former    Types: Snuff    Quit date:        Social History     Substance and Sexual Activity   Alcohol Use Yes    Comment: social       Review of Systems   Constitutional:  Negative for appetite change, chills and fever. Eyes:  Negative for redness and visual disturbance.    Respiratory:  Negative for cough, shortness of breath and

## 2023-08-29 ENCOUNTER — HOSPITAL ENCOUNTER (OUTPATIENT)
Dept: GENERAL RADIOLOGY | Age: 63
Discharge: HOME | End: 2023-08-31
Payer: COMMERCIAL

## 2023-08-29 ENCOUNTER — HOSPITAL ENCOUNTER (OUTPATIENT)
Age: 63
Discharge: HOME | End: 2023-08-31
Payer: COMMERCIAL

## 2023-08-29 ENCOUNTER — OFFICE VISIT (OUTPATIENT)
Dept: UROLOGY | Age: 63
End: 2023-08-29
Payer: COMMERCIAL

## 2023-08-29 VITALS
TEMPERATURE: 97.9 F | HEART RATE: 82 BPM | WEIGHT: 292 LBS | SYSTOLIC BLOOD PRESSURE: 119 MMHG | HEIGHT: 70 IN | DIASTOLIC BLOOD PRESSURE: 78 MMHG | RESPIRATION RATE: 18 BRPM | BODY MASS INDEX: 41.8 KG/M2

## 2023-08-29 DIAGNOSIS — N20.0 KIDNEY STONE: ICD-10-CM

## 2023-08-29 DIAGNOSIS — N20.0 KIDNEY STONE: Primary | ICD-10-CM

## 2023-08-29 PROCEDURE — 74018 RADEX ABDOMEN 1 VIEW: CPT

## 2023-08-29 PROCEDURE — 99213 OFFICE O/P EST LOW 20 MIN: CPT | Performed by: NURSE PRACTITIONER

## 2023-08-29 ASSESSMENT — ENCOUNTER SYMPTOMS
WHEEZING: 0
CONSTIPATION: 0
NAUSEA: 0
SHORTNESS OF BREATH: 0
EYE REDNESS: 0
BACK PAIN: 0
COLOR CHANGE: 0
ABDOMINAL PAIN: 0
COUGH: 0
VOMITING: 0

## 2024-09-09 ENCOUNTER — TELEPHONE (OUTPATIENT)
Dept: UROLOGY | Age: 64
End: 2024-09-09

## 2024-09-09 DIAGNOSIS — N20.0 KIDNEY STONE: Primary | ICD-10-CM

## 2024-09-10 ENCOUNTER — HOSPITAL ENCOUNTER (OUTPATIENT)
Dept: GENERAL RADIOLOGY | Age: 64
Discharge: HOME | End: 2024-09-12
Payer: COMMERCIAL

## 2024-09-10 ENCOUNTER — OFFICE VISIT (OUTPATIENT)
Dept: UROLOGY | Age: 64
End: 2024-09-10
Payer: COMMERCIAL

## 2024-09-10 ENCOUNTER — HOSPITAL ENCOUNTER (OUTPATIENT)
Age: 64
Discharge: HOME | End: 2024-09-12
Payer: COMMERCIAL

## 2024-09-10 VITALS
SYSTOLIC BLOOD PRESSURE: 134 MMHG | WEIGHT: 290.6 LBS | BODY MASS INDEX: 41.7 KG/M2 | TEMPERATURE: 97.6 F | DIASTOLIC BLOOD PRESSURE: 86 MMHG

## 2024-09-10 DIAGNOSIS — N20.0 KIDNEY STONE: Primary | ICD-10-CM

## 2024-09-10 DIAGNOSIS — N20.0 KIDNEY STONE: ICD-10-CM

## 2024-09-10 PROCEDURE — 99213 OFFICE O/P EST LOW 20 MIN: CPT | Performed by: NURSE PRACTITIONER

## 2024-09-10 PROCEDURE — 74018 RADEX ABDOMEN 1 VIEW: CPT

## 2024-09-10 RX ORDER — SEMAGLUTIDE 0.68 MG/ML
0.5 INJECTION, SOLUTION SUBCUTANEOUS WEEKLY
COMMUNITY
Start: 2024-07-30